# Patient Record
Sex: FEMALE | Race: WHITE | NOT HISPANIC OR LATINO | Employment: STUDENT | URBAN - METROPOLITAN AREA
[De-identification: names, ages, dates, MRNs, and addresses within clinical notes are randomized per-mention and may not be internally consistent; named-entity substitution may affect disease eponyms.]

---

## 2023-07-12 ENCOUNTER — NURSE TRIAGE (OUTPATIENT)
Dept: ADMINISTRATIVE | Facility: CLINIC | Age: 28
End: 2023-07-12
Payer: COMMERCIAL

## 2023-07-12 NOTE — TELEPHONE ENCOUNTER
Reason for Disposition   Nursing judgment    Protocols used: No Protocol Available - Sick Adult-A-[place]  Pt states she has trapped ingrown hairs in her face and she's concerned. States her neurologist cannot see her until September. Pt advised to see a [facility] Provider. Appointment set tomorrow with CEDRIC Jimenez NP. Instructed to call OOC back if symptoms worsen. Pt verbalized understanding.